# Patient Record
Sex: MALE | URBAN - METROPOLITAN AREA
[De-identification: names, ages, dates, MRNs, and addresses within clinical notes are randomized per-mention and may not be internally consistent; named-entity substitution may affect disease eponyms.]

---

## 2017-04-26 ENCOUNTER — HISTORICAL (OUTPATIENT)
Dept: ADMINISTRATIVE | Facility: HOSPITAL | Age: 42
End: 2017-04-26

## 2017-06-14 ENCOUNTER — HISTORICAL (OUTPATIENT)
Dept: ADMINISTRATIVE | Facility: HOSPITAL | Age: 42
End: 2017-06-14

## 2018-11-27 LAB — RAPID GROUP A STREP (OHS): NEGATIVE

## 2022-04-10 ENCOUNTER — HISTORICAL (OUTPATIENT)
Dept: ADMINISTRATIVE | Facility: HOSPITAL | Age: 47
End: 2022-04-10

## 2022-04-27 VITALS
OXYGEN SATURATION: 97 % | SYSTOLIC BLOOD PRESSURE: 149 MMHG | HEIGHT: 69 IN | BODY MASS INDEX: 26.84 KG/M2 | WEIGHT: 181.19 LBS | DIASTOLIC BLOOD PRESSURE: 80 MMHG

## 2022-05-03 NOTE — HISTORICAL [facility]
This is a historical note converted from Carmela. Formatting and pictures may have been removed.  Please reference Carmela for original formatting and attached multimedia. 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 Thought would get better but hasnt.  History of Present Illness  He is a pleasant 41-year-old right-hand-dominant male who?was putting an inmate?in a?hold?and had an injury to his right shoulder. ?He had?immediate pain which was localized?anteriorly.? It is worse with overhead movement. ?It somewhat better?with rest. ?He is tried some over-the-counter medicine with out lasting results. ?He has had no?injections.? He has had no advanced imaging.? He has had no formal physical therapy.  Review of Systems  No diabetes,?cardiac problems or chest pain,?pulmonary problems or asthma,?history of prior dvt or clotting problems,?sleep apnea.  Physical Exam  Vitals & Measurements  BP:?132/94? HT:?175?cm? HT:?175?cm? HT:?175?cm? WT:?77?kg? WT:?77?kg? WT:?77?kg? BMI:?25.14?  Gen: WN, WD, NAD  Card/Res: NL breathing, +distal pulses  Abdomen: ND  Shoulder Exam:??????????Right??????????Left  Skin:??????????????????????????????Normal???????Normal  AC joint tenderness:??????????None??????????None  Forward Flexion:?????????????170 ??????????180  Abduction:?????????????????????180??????????? 180  External Rotation: ????????????? 80??????????????80  Internal Rotation?????????????? 80 ???????????? 80  Supraspinatus stress test?????? Neg??????????Neg  Sal Impingement:?? ?????Neg ?????????? ?Neg  Neer Impingement:?????????????+??????????Neg  Apprehension:???????????????????? Neg??????????Neg  OBriens:????????????????????????????Neg????????? Neg  Speeds test:??????????????????????? Neg??????????Neg  Strength:  External Rotation:???????????????5/5???????????????5/5  Lift Off/belly press:????????????5/5???????????????5/5  ?   N-V  status:?????????????????????????Intact??????????Intact  ?   C-spine: Normal ROM, NT  ?  ?  Assessment/Plan  1.?Shoulder impingement  We will give him an injection today to calm down the inflammation. ?We will also start him a home exercise program. ?He can return to work full duty. ?I will see him back as needed  Ordered:  betamethasone, 12 mg, Intra-Articular, Once, first dose 04/26/17 12:00:00 CDT, stop date 04/26/17 12:00:00 CDT, 24  Lidocaine inj., 2 mL, Intra-Articular, Once, first dose 04/26/17 11:52:00 CDT, stop date 04/26/17 11:52:00 CDT  asp/inj jnt/bursa, major [id number] PC, 04/26/17 11:52:00 CDT, [facility], Routine, 04/26/17 11:52:00 CDT  Office/Outpatient Visit Level 3 [facility] [id number] PC, Shoulder impingement, [facility], 04/26/17 11:52:00 CDT  ?  Orders:  Clinic Follow-up PRN, 04/26/17 11:52:00 CDT, Future Order, [facility]   Problem List/Past Medical History  Anxiety  Historical  No historical problems  Procedure/Surgical History  right knee sx. (2010), Hernia, inguinal. (2006), right knee sx. (2006).  Medications  Celestone, 12 mg, Intra-Articular, Once  lidocaine 2% injectable solution, 2 mL, Intra-Articular, Once  Zoloft 25 mg oral tablet, 25 mg, 1 tab(s), Oral, Daily,? ?Not taking: patient states never took  Allergies  No Known Medication Allergies  Social History  Alcohol  Current, Liquor, 1-2 times per month  Employment/School  Employed  Substance Abuse  Never  Tobacco - Denies Tobacco Use  Former smoker, Cigarettes, 5 year(s).  Diagnostic Results  Shoulder radiographs 4/26/2017:?4 views of the shoulder show no arthrosis.      Risks of cortisone were discussed with the patient including hypopigmentation subcutaneous fat atrophy, and post injection pain flare. The patient understood these risks and requested to proceed. The?right shoulder?was prepped with betadine. The 1cc of steroid and 5cc of lidocaine injection was administered under sterile techinique. The injection  was administered in clinic by me, Galdino Mejia, and the patient tolerated the procedure well.  ?

## 2022-05-03 NOTE — HISTORICAL [facility]
This is a historical note converted from Carmela. Formatting and pictures may have been removed.  Please reference Carmela for original formatting and attached multimedia. Chief Complaint  left shoulder pain, progressively worsening since April injury. 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 ?He has had a second injury while again wrestling an inmate. ?This time it is his left shoulder. ?The pain in his left shoulder is anterior.? Is worse with certain type of movement specifically?internal rotation.? He is using some over-the-counter medicines.? He denies any numbness or tingling. ?He has had no injections. ?He has had no prescription strength anti-inflammatory medicines. ?He has had no advanced imaging  Review of Systems  No diabetes,?cardiac problems or chest pain,?pulmonary problems or asthma,?history of prior dvt or clotting problems,?sleep apnea.  Physical Exam  Vitals & Measurements  HR:?78?(Peripheral)? BP:?132/94? HT:?175?cm? HT:?175?cm? WT:?77?kg? WT:?77?kg? BMI:?25.14?  Gen: WN, WD, NAD  Card/Res: NL breathing, +distal pulses  Abdomen: ND  Shoulder Exam:??????????Right??????????Left  Skin:??????????????????????????????Normal???????Normal  AC joint tenderness:??????????None??????????None  Forward Flexion:?????????????180 ??????????170  Abduction:?????????????????????180??????????? 150  External Rotation: ????????????? 80?????????????60  Internal Rotation?????????????? 80 ???????????? 80  Supraspinatus stress test?????? Neg??????????Neg  Sal Impingement:?? ?????Neg ?????????? ?Neg  Neer Impingement:?????????????Neg??????????? +  Apprehension:???????????????????? Neg??????????Neg  OBriens:????????????????????????????Neg?????????? ++  Speeds test:??????????????????????? Neg??????????Neg  Strength:  External  Rotation:???????????????5/5???????????????5/5  Lift Off/belly press:????????????5/5???????????????5/5  ?   N-V status:?????????????????????????Intact??????????Intact  ?   C-spine: Normal ROM, NT  ?  ?  Assessment/Plan  1.?Biceps tendonitis  I recommended conservative measures: Subacromial injection and?prescription strength anti-inflammatory medicine. ?If his pain persists we consider an MRI of the shoulder.  ?  Risks of cortisone were discussed with the patient including hypopigmentation subcutaneous fat atrophy, and post injection pain flare. The patient understood these risks and requested to proceed. The left shoulder was prepped with betadine. The 1cc of steroid and 5cc of lidocaine injection was administered under sterile techinique. The injection was administered in clinic by me, Galdino Mejia, and the patient tolerated the procedure well.  ?  Ordered:  betamethasone, 12 mg, Intra-Articular, Once, first dose 06/14/17 11:00:00 CDT, stop date 06/14/17 11:00:00 CDT, 24  Lidocaine inj., 2 mL, Intra-Articular, Once, first dose 06/14/17 10:19:00 CDT, stop date 06/14/17 10:19:00 CDT  asp/inj jnt/bursa, major [id number] PC, 06/14/17 10:19:00 CDT, [facility], Routine, 06/14/17 10:19:00 CDT  Office/Outpatient Visit Level 3 Established [id number] PC, Biceps tendonitis, [facility], 06/14/17 10:19:00 CDT  ?  Orders:  naproxen, 500 mg = 1 tab(s), Oral, BID, # 60 tab(s), 0 Refill(s), Pharmacy: [facility]/pharmacy #[id number]  Clinic Follow-up PRN, 06/14/17 10:19:00 CDT, Future Order, [facility] [employer]  XR Shoulder Left Minimum 2 Views, Routine, 06/14/17 9:53:00 CDT, Pain, None, Ambulatory, Rad Type, Shoulder contusion, Not Scheduled, 06/14/17 9:53:00 CDT   Problem List/Past Medical History  Anxiety  Historical  No historical problems  Procedure/Surgical History  right knee sx. (2010), Hernia, inguinal. (2006), right knee sx. (2006).  Medications  Celestone, 12 mg, Intra-Articular, Once  lidocaine 2% injectable solution,  2 mL, Intra-Articular, Once  Naprosyn 500 mg oral tablet, 500 mg, 1 tab(s), Oral, BID  Zoloft 25 mg oral tablet, 25 mg, 1 tab(s), Oral, Daily,? ?Not taking: patient states never took  Allergies  No Known Medication Allergies  Social History  Alcohol  Current, Liquor, 1-2 times per month  Employment/School  Employed  Substance Abuse  Never  Tobacco - Denies Tobacco Use  Former smoker, Cigarettes, 5 year(s).  Diagnostic Results  Left shoulder radiograph 6/14/2017:?4 views of the shoulder show no arthrosis.

## 2022-09-17 ENCOUNTER — HISTORICAL (OUTPATIENT)
Dept: ADMINISTRATIVE | Facility: HOSPITAL | Age: 47
End: 2022-09-17

## 2022-11-07 ENCOUNTER — APPOINTMENT (OUTPATIENT)
Age: 47
Setting detail: DERMATOLOGY
End: 2022-11-07

## 2022-11-07 VITALS — TEMPERATURE: 98.7 F

## 2022-11-07 DIAGNOSIS — Q826 OTHER SPECIFIED ANOMALIES OF SKIN: ICD-10-CM

## 2022-11-07 DIAGNOSIS — D18.0 HEMANGIOMA: ICD-10-CM

## 2022-11-07 DIAGNOSIS — R20.2 PARESTHESIA OF SKIN: ICD-10-CM

## 2022-11-07 DIAGNOSIS — Q819 OTHER SPECIFIED ANOMALIES OF SKIN: ICD-10-CM

## 2022-11-07 DIAGNOSIS — L70.0 ACNE VULGARIS: ICD-10-CM

## 2022-11-07 DIAGNOSIS — Q828 OTHER SPECIFIED ANOMALIES OF SKIN: ICD-10-CM

## 2022-11-07 DIAGNOSIS — Z71.89 OTHER SPECIFIED COUNSELING: ICD-10-CM

## 2022-11-07 DIAGNOSIS — L81.4 OTHER MELANIN HYPERPIGMENTATION: ICD-10-CM

## 2022-11-07 DIAGNOSIS — L82.1 OTHER SEBORRHEIC KERATOSIS: ICD-10-CM

## 2022-11-07 PROBLEM — D18.01 HEMANGIOMA OF SKIN AND SUBCUTANEOUS TISSUE: Status: ACTIVE | Noted: 2022-11-07

## 2022-11-07 PROBLEM — L85.8 OTHER SPECIFIED EPIDERMAL THICKENING: Status: ACTIVE | Noted: 2022-11-07

## 2022-11-07 PROCEDURE — OTHER OTHER: OTHER

## 2022-11-07 PROCEDURE — 99203 OFFICE O/P NEW LOW 30 MIN: CPT

## 2022-11-07 PROCEDURE — OTHER COUNSELING: OTHER

## 2022-11-07 PROCEDURE — OTHER TREATMENT REGIMEN: OTHER

## 2022-11-07 ASSESSMENT — LOCATION SIMPLE DESCRIPTION DERM
LOCATION SIMPLE: RIGHT CHEEK
LOCATION SIMPLE: LEFT SHOULDER
LOCATION SIMPLE: RIGHT UPPER BACK
LOCATION SIMPLE: LEFT FOREARM
LOCATION SIMPLE: LEFT LOWER BACK
LOCATION SIMPLE: LEFT TEMPLE
LOCATION SIMPLE: LEFT UPPER ARM
LOCATION SIMPLE: RIGHT LOWER BACK
LOCATION SIMPLE: LEFT CHEEK
LOCATION SIMPLE: LEFT UPPER BACK
LOCATION SIMPLE: RIGHT UPPER ARM
LOCATION SIMPLE: LEFT FOREHEAD
LOCATION SIMPLE: RIGHT FOREARM
LOCATION SIMPLE: RIGHT FOREHEAD
LOCATION SIMPLE: CHEST
LOCATION SIMPLE: RIGHT SHOULDER
LOCATION SIMPLE: ABDOMEN

## 2022-11-07 ASSESSMENT — LOCATION DETAILED DESCRIPTION DERM
LOCATION DETAILED: RIGHT INFERIOR UPPER BACK
LOCATION DETAILED: LEFT POSTERIOR SHOULDER
LOCATION DETAILED: RIGHT ANTERIOR DISTAL UPPER ARM
LOCATION DETAILED: LEFT ANTERIOR DISTAL UPPER ARM
LOCATION DETAILED: RIGHT POSTERIOR SHOULDER
LOCATION DETAILED: RIGHT MEDIAL INFERIOR CHEST
LOCATION DETAILED: RIGHT SUPERIOR LATERAL UPPER BACK
LOCATION DETAILED: RIGHT SUPERIOR MEDIAL MIDBACK
LOCATION DETAILED: RIGHT VENTRAL PROXIMAL FOREARM
LOCATION DETAILED: LEFT INFERIOR MEDIAL MIDBACK
LOCATION DETAILED: LEFT VENTRAL PROXIMAL FOREARM
LOCATION DETAILED: LEFT LATERAL MALAR CHEEK
LOCATION DETAILED: LEFT MID TEMPLE
LOCATION DETAILED: RIGHT DISTAL DORSAL FOREARM
LOCATION DETAILED: RIGHT SUPERIOR LATERAL MALAR CHEEK
LOCATION DETAILED: LEFT SUPERIOR MEDIAL UPPER BACK
LOCATION DETAILED: LEFT FOREHEAD
LOCATION DETAILED: LEFT SUPERIOR MEDIAL MIDBACK
LOCATION DETAILED: RIGHT LATERAL MALAR CHEEK
LOCATION DETAILED: LEFT DISTAL POSTERIOR UPPER ARM
LOCATION DETAILED: RIGHT FOREHEAD
LOCATION DETAILED: RIGHT LATERAL SUPERIOR CHEST
LOCATION DETAILED: LEFT LATERAL SUPERIOR CHEST
LOCATION DETAILED: LEFT MID-UPPER BACK
LOCATION DETAILED: LEFT LATERAL ABDOMEN
LOCATION DETAILED: EPIGASTRIC SKIN
LOCATION DETAILED: RIGHT MID-UPPER BACK
LOCATION DETAILED: RIGHT DISTAL POSTERIOR UPPER ARM
LOCATION DETAILED: LEFT PROXIMAL POSTERIOR UPPER ARM
LOCATION DETAILED: PERIUMBILICAL SKIN
LOCATION DETAILED: RIGHT PROXIMAL DORSAL FOREARM
LOCATION DETAILED: LEFT LATERAL UPPER BACK
LOCATION DETAILED: LEFT INFERIOR CENTRAL MALAR CHEEK
LOCATION DETAILED: LEFT PROXIMAL DORSAL FOREARM
LOCATION DETAILED: RIGHT INFERIOR MEDIAL MIDBACK
LOCATION DETAILED: RIGHT SUPERIOR UPPER BACK
LOCATION DETAILED: LEFT INFERIOR UPPER BACK
LOCATION DETAILED: LEFT LATERAL INFERIOR CHEST
LOCATION DETAILED: RIGHT INFERIOR CENTRAL MALAR CHEEK

## 2022-11-07 ASSESSMENT — LOCATION ZONE DERM
LOCATION ZONE: TRUNK
LOCATION ZONE: ARM
LOCATION ZONE: FACE

## 2022-11-07 NOTE — PROCEDURE: TREATMENT REGIMEN
Otc Regimen: CLN body wash—Use QD
Samples Given: CLN body wash
Detail Level: Zone
Plan: —Will give Rx at next visit if no improvement with otc CLN body wash

## 2022-11-07 NOTE — PROCEDURE: COUNSELING
Birth Control Pills Pregnancy And Lactation Text: This medication should be avoided if pregnant and for the first 30 days post-partum.
Topical Retinoid Pregnancy And Lactation Text: This medication is Pregnancy Category C. It is unknown if this medication is excreted in breast milk.
Azithromycin Counseling:  I discussed with the patient the risks of azithromycin including but not limited to GI upset, allergic reaction, drug rash, diarrhea, and yeast infections.
Winlevi Pregnancy And Lactation Text: This medication is considered safe during pregnancy and breastfeeding.
Azithromycin Pregnancy And Lactation Text: This medication is considered safe during pregnancy and is also secreted in breast milk.
Benzoyl Peroxide Counseling: Patient counseled that medicine may cause skin irritation and bleach clothing.  In the event of skin irritation, the patient was advised to reduce the amount of the drug applied or use it less frequently.   The patient verbalized understanding of the proper use and possible adverse effects of benzoyl peroxide.  All of the patient's questions and concerns were addressed.
Detail Level: Detailed
Isotretinoin Counseling: Patient should get monthly blood tests, not donate blood, not drive at night if vision affected, not share medication, and not undergo elective surgery for 6 months after tx completed. Side effects reviewed, pt to contact office should one occur.
Isotretinoin Pregnancy And Lactation Text: This medication is Pregnancy Category X and is considered extremely dangerous during pregnancy. It is unknown if it is excreted in breast milk.
Bactrim Counseling:  I discussed with the patient the risks of sulfa antibiotics including but not limited to GI upset, allergic reaction, drug rash, diarrhea, dizziness, photosensitivity, and yeast infections.  Rarely, more serious reactions can occur including but not limited to aplastic anemia, agranulocytosis, methemoglobinemia, blood dyscrasias, liver or kidney failure, lung infiltrates or desquamative/blistering drug rashes.
Aklief counseling:  Patient advised to apply a pea-sized amount only at bedtime and wait 30 minutes after washing their face before applying.  If too drying, patient may add a non-comedogenic moisturizer.  The most commonly reported side effects including irritation, redness, scaling, dryness, stinging, burning, itching, and increased risk of sunburn.  The patient verbalized understanding of the proper use and possible adverse effects of retinoids.  All of the patient's questions and concerns were addressed.
Doxycycline Counseling:  Patient counseled regarding possible photosensitivity and increased risk for sunburn.  Patient instructed to avoid sunlight, if possible.  When exposed to sunlight, patients should wear protective clothing, sunglasses, and sunscreen.  The patient was instructed to call the office immediately if the following severe adverse effects occur:  hearing changes, easy bruising/bleeding, severe headache, or vision changes.  The patient verbalized understanding of the proper use and possible adverse effects of doxycycline.  All of the patient's questions and concerns were addressed.
Spironolactone Counseling: Patient advised regarding risks of diarrhea, abdominal pain, hyperkalemia, birth defects (for female patients), liver toxicity and renal toxicity. The patient may need blood work to monitor liver and kidney function and potassium levels while on therapy. The patient verbalized understanding of the proper use and possible adverse effects of spironolactone.  All of the patient's questions and concerns were addressed.
Skin Checks Recommendations: Advised to continue with routine skin checks and continue to follow. Follow up earlier if new lesions appear, if any lesions begin to change/bleed/ulcerate/grow/become tender.
Topical Retinoid counseling:  Patient advised to apply a pea-sized amount only at bedtime and wait 30 minutes after washing their face before applying.  If too drying, patient may add a non-comedogenic moisturizer. The patient verbalized understanding of the proper use and possible adverse effects of retinoids.  All of the patient's questions and concerns were addressed.
Sarecycline Pregnancy And Lactation Text: This medication is Pregnancy Category D and not consider safe during pregnancy. It is also excreted in breast milk.
Dapsone Counseling: I discussed with the patient the risks of dapsone including but not limited to hemolytic anemia, agranulocytosis, rashes, methemoglobinemia, kidney failure, peripheral neuropathy, headaches, GI upset, and liver toxicity.  Patients who start dapsone require monitoring including baseline LFTs and weekly CBCs for the first month, then every month thereafter.  The patient verbalized understanding of the proper use and possible adverse effects of dapsone.  All of the patient's questions and concerns were addressed.
Erythromycin Pregnancy And Lactation Text: This medication is Pregnancy Category B and is considered safe during pregnancy. It is also excreted in breast milk.
Dapsone Pregnancy And Lactation Text: This medication is Pregnancy Category C and is not considered safe during pregnancy or breast feeding.
Use Enhanced Medication Counseling?: No
Azelaic Acid Counseling: Patient counseled that medicine may cause skin irritation and to avoid applying near the eyes.  In the event of skin irritation, the patient was advised to reduce the amount of the drug applied or use it less frequently.   The patient verbalized understanding of the proper use and possible adverse effects of azelaic acid.  All of the patient's questions and concerns were addressed.
Winlevi Counseling:  I discussed with the patient the risks of topical clascoterone including but not limited to erythema, scaling, itching, and stinging. Patient voiced their understanding.
Erythromycin Counseling:  I discussed with the patient the risks of erythromycin including but not limited to GI upset, allergic reaction, drug rash, diarrhea, increase in liver enzymes, and yeast infections.
Tazorac Counseling:  Patient advised that medication is irritating and drying.  Patient may need to apply sparingly and wash off after an hour before eventually leaving it on overnight.  The patient verbalized understanding of the proper use and possible adverse effects of tazorac.  All of the patient's questions and concerns were addressed.
Topical Sulfur Applications Counseling: Topical Sulfur Counseling: Patient counseled that this medication may cause skin irritation or allergic reactions.  In the event of skin irritation, the patient was advised to reduce the amount of the drug applied or use it less frequently.   The patient verbalized understanding of the proper use and possible adverse effects of topical sulfur application.  All of the patient's questions and concerns were addressed.
Azelaic Acid Pregnancy And Lactation Text: This medication is considered safe during pregnancy and breast feeding.
Spironolactone Pregnancy And Lactation Text: This medication can cause feminization of the male fetus and should be avoided during pregnancy. The active metabolite is also found in breast milk.
Doxycycline Pregnancy And Lactation Text: This medication is Pregnancy Category D and not consider safe during pregnancy. It is also excreted in breast milk but is considered safe for shorter treatment courses.
Tazorac Pregnancy And Lactation Text: This medication is not safe during pregnancy. It is unknown if this medication is excreted in breast milk.
Minocycline Counseling: Patient advised regarding possible photosensitivity and discoloration of the teeth, skin, lips, tongue and gums.  Patient instructed to avoid sunlight, if possible.  When exposed to sunlight, patients should wear protective clothing, sunglasses, and sunscreen.  The patient was instructed to call the office immediately if the following severe adverse effects occur:  hearing changes, easy bruising/bleeding, severe headache, or vision changes.  The patient verbalized understanding of the proper use and possible adverse effects of minocycline.  All of the patient's questions and concerns were addressed.
Bactrim Pregnancy And Lactation Text: This medication is Pregnancy Category D and is known to cause fetal risk.  It is also excreted in breast milk.
Topical Clindamycin Pregnancy And Lactation Text: This medication is Pregnancy Category B and is considered safe during pregnancy. It is unknown if it is excreted in breast milk.
Aklief Pregnancy And Lactation Text: It is unknown if this medication is safe to use during pregnancy.  It is unknown if this medication is excreted in breast milk.  Breastfeeding women should use the topical cream on the smallest area of the skin for the shortest time needed while breastfeeding.  Do not apply to nipple and areola.
Birth Control Pills Counseling: Birth Control Pill Counseling: I discussed with the patient the potential side effects of OCPs including but not limited to increased risk of stroke, heart attack, thrombophlebitis, deep venous thrombosis, hepatic adenomas, breast changes, GI upset, headaches, and depression.  The patient verbalized understanding of the proper use and possible adverse effects of OCPs. All of the patient's questions and concerns were addressed.
Benzoyl Peroxide Pregnancy And Lactation Text: This medication is Pregnancy Category C. It is unknown if benzoyl peroxide is excreted in breast milk.
High Dose Vitamin A Pregnancy And Lactation Text: High dose vitamin A therapy is contraindicated during pregnancy and breast feeding.
Topical Clindamycin Counseling: Patient counseled that this medication may cause skin irritation or allergic reactions.  In the event of skin irritation, the patient was advised to reduce the amount of the drug applied or use it less frequently.   The patient verbalized understanding of the proper use and possible adverse effects of clindamycin.  All of the patient's questions and concerns were addressed.
High Dose Vitamin A Counseling: Side effects reviewed, pt to contact office should one occur.
Sarecycline Counseling: Patient advised regarding possible photosensitivity and discoloration of the teeth, skin, lips, tongue and gums.  Patient instructed to avoid sunlight, if possible.  When exposed to sunlight, patients should wear protective clothing, sunglasses, and sunscreen.  The patient was instructed to call the office immediately if the following severe adverse effects occur:  hearing changes, easy bruising/bleeding, severe headache, or vision changes.  The patient verbalized understanding of the proper use and possible adverse effects of sarecycline.  All of the patient's questions and concerns were addressed.
Topical Sulfur Applications Pregnancy And Lactation Text: This medication is Pregnancy Category C and has an unknown safety profile during pregnancy. It is unknown if this topical medication is excreted in breast milk.
Tetracycline Counseling: Patient counseled regarding possible photosensitivity and increased risk for sunburn.  Patient instructed to avoid sunlight, if possible.  When exposed to sunlight, patients should wear protective clothing, sunglasses, and sunscreen.  The patient was instructed to call the office immediately if the following severe adverse effects occur:  hearing changes, easy bruising/bleeding, severe headache, or vision changes.  The patient verbalized understanding of the proper use and possible adverse effects of tetracycline.  All of the patient's questions and concerns were addressed. Patient understands to avoid pregnancy while on therapy due to potential birth defects.

## 2022-11-07 NOTE — PROCEDURE: OTHER
Note Text (......Xxx Chief Complaint.): This diagnosis correlates with the
Detail Level: Zone
Render Risk Assessment In Note?: no
Other (Free Text): Recommended OTC Amlactin QD